# Patient Record
Sex: MALE | Race: WHITE | NOT HISPANIC OR LATINO | URBAN - METROPOLITAN AREA
[De-identification: names, ages, dates, MRNs, and addresses within clinical notes are randomized per-mention and may not be internally consistent; named-entity substitution may affect disease eponyms.]

---

## 2019-04-29 ENCOUNTER — INPATIENT HOSPITAL (OUTPATIENT)
Dept: URBAN - METROPOLITAN AREA HOSPITAL 32 | Facility: HOSPITAL | Age: 74
End: 2019-04-29
Payer: COMMERCIAL

## 2019-04-29 DIAGNOSIS — K92.2 GASTROINTESTINAL HEMORRHAGE, UNSPECIFIED: ICD-10-CM

## 2019-04-29 DIAGNOSIS — K29.60 OTHER GASTRITIS WITHOUT BLEEDING: ICD-10-CM

## 2019-04-29 DIAGNOSIS — K92.1 MELENA: ICD-10-CM

## 2019-04-29 DIAGNOSIS — D62 ACUTE POSTHEMORRHAGIC ANEMIA: ICD-10-CM

## 2019-04-29 PROCEDURE — 43239 EGD BIOPSY SINGLE/MULTIPLE: CPT

## 2022-01-12 ENCOUNTER — OFFICE (OUTPATIENT)
Dept: URBAN - METROPOLITAN AREA CLINIC 102 | Facility: CLINIC | Age: 77
End: 2022-01-12
Payer: COMMERCIAL

## 2022-01-12 VITALS
TEMPERATURE: 96.7 F | DIASTOLIC BLOOD PRESSURE: 70 MMHG | WEIGHT: 199 LBS | HEART RATE: 70 BPM | SYSTOLIC BLOOD PRESSURE: 142 MMHG

## 2022-01-12 DIAGNOSIS — R13.10 DYSPHAGIA, UNSPECIFIED: ICD-10-CM

## 2022-01-12 DIAGNOSIS — K44.9 DIAPHRAGMATIC HERNIA WITHOUT OBSTRUCTION OR GANGRENE: ICD-10-CM

## 2022-01-12 PROCEDURE — 99204 OFFICE O/P NEW MOD 45 MIN: CPT | Performed by: INTERNAL MEDICINE

## 2022-01-12 NOTE — SERVICEHPINOTES
76yoM hx of type 2 DM, anxiety, depression who is a resident of long term care facility brought by his daughter for evaluation of dysphagia. He has recurrent episodes of difficult with swallowing solid foods followed by cough and choking episodes. He would subsequently have significant mucus secretion and regurgitation of small pieces of food. He has loss of taste for the past 2 months and has been swallowing without tasting much of his food. Two of these incidences occurred while eating lettuce but otherwise there were no other food triggers. He is able to swallow pills w/o issues. Patient has lost about 15lbs over the past months and half.susie richards He had an incidence on severe choking and coughing episode 12/17 that [facility] Reviewed records: labs and CXR were normal. Barium Swallow with Speech eval: no aspiration Speech therapy eval 12/23. Reviewed records from Speech therapy. During the esophageal phase he was noted to have esophageal retention and retrograde flow below the pharyngoesophageal segment opening and further GI evaluation was recommended.susie richards Of note, patient had an EGD in 2019 for evaluation of melena and noted to have gastritis and small hiatal hernia.
susie Cross 10 point review of systems have been reviewed as per HPI and otherwise negative. 
susie richards

## 2022-01-14 ENCOUNTER — ON CAMPUS - OUTPATIENT (OUTPATIENT)
Dept: URBAN - METROPOLITAN AREA HOSPITAL 16 | Facility: HOSPITAL | Age: 77
End: 2022-01-14
Payer: COMMERCIAL

## 2022-01-14 DIAGNOSIS — K29.60 OTHER GASTRITIS WITHOUT BLEEDING: ICD-10-CM

## 2022-01-14 DIAGNOSIS — R13.10 DYSPHAGIA, UNSPECIFIED: ICD-10-CM

## 2022-01-14 PROCEDURE — 43249 ESOPH EGD DILATION <30 MM: CPT | Performed by: INTERNAL MEDICINE

## 2022-01-14 PROCEDURE — 43239 EGD BIOPSY SINGLE/MULTIPLE: CPT | Mod: 59 | Performed by: INTERNAL MEDICINE

## 2022-02-11 ENCOUNTER — OFFICE (OUTPATIENT)
Dept: URBAN - METROPOLITAN AREA TELEHEALTH 12 | Facility: TELEHEALTH | Age: 77
End: 2022-02-11
Payer: COMMERCIAL

## 2022-02-11 VITALS — WEIGHT: 199 LBS

## 2022-02-11 DIAGNOSIS — R13.10 DYSPHAGIA, UNSPECIFIED: ICD-10-CM

## 2022-02-11 DIAGNOSIS — K44.9 DIAPHRAGMATIC HERNIA WITHOUT OBSTRUCTION OR GANGRENE: ICD-10-CM

## 2022-02-11 PROCEDURE — 99214 OFFICE O/P EST MOD 30 MIN: CPT | Mod: 95 | Performed by: INTERNAL MEDICINE

## 2022-02-11 RX ORDER — PANTOPRAZOLE 20 MG/1
20 TABLET, DELAYED RELEASE ORAL
Qty: 90 | Refills: 3 | Status: ACTIVE
Start: 2022-02-11

## 2022-02-11 NOTE — SERVICEHPINOTES
PATIENT VERIFIED BY DATE OF BIRTH AND NAME. Patient has been consented for this telecommunication visit.
brLast visit 1/12br
"76yoM hx of type 2 DM, anxiety, depression who is a resident of long-term care facility brought by his daughter for evaluation of dysphagia. He has recurrent episodes of difficult with swallowing solid foods followed by cough and choking episodes. He would subsequently have significant mucus secretion and regurgitation of small pieces of food. He has loss of taste for the past 2 months and has been swallowing without tasting much of his food. Two of these incidences occurred while eating lettuce but otherwise there were no other food triggers. He is able to swallow pills w/o issues. Patient has lost about 15lbs over the past months and half. He had an incidence on severe choking and coughing episode 12/17 that prompted ED visit at [facility]. Reviewed records: labs and CXR were normal. Barium Swallow with Speech eval: no aspiration Speech therapy eval 12/23. Reviewed records from Speech therapy. During the esophageal phase he was noted to have esophageal retention and retrograde flow below the pharyngoesophageal segment opening and further GI evaluation was recommended.Of note, patient had an EGD in 2019 for evaluation of melena and noted to have gastritis and small hiatal hernia."br
br Interval events 
brHad an EGD 1/14 showed 6cm HH and Schatzki's ring s/p dilation to max 18mm.
brHad [employer] bread chicken salad sandwich the same day with no issues. No recurrent dysphagia or choking spells for the most part. He had one incidence of eating too fast while drinking water at the same time that triggered swallowing issue. He has been on mechanical soft diet for the past 2 months at his [facility]. br He has chronic reflux and not taking any reflux meds.
br No other issues otherwise.br br
All 10 point review of systems have been reviewed as per HPI and otherwise negative. br
br